# Patient Record
Sex: MALE | Race: WHITE | NOT HISPANIC OR LATINO | URBAN - METROPOLITAN AREA
[De-identification: names, ages, dates, MRNs, and addresses within clinical notes are randomized per-mention and may not be internally consistent; named-entity substitution may affect disease eponyms.]

---

## 2018-04-13 ENCOUNTER — OUTPATIENT (OUTPATIENT)
Dept: OUTPATIENT SERVICES | Facility: HOSPITAL | Age: 68
LOS: 1 days | End: 2018-04-13
Payer: COMMERCIAL

## 2018-04-13 VITALS
TEMPERATURE: 98 F | SYSTOLIC BLOOD PRESSURE: 142 MMHG | DIASTOLIC BLOOD PRESSURE: 76 MMHG | HEIGHT: 68.5 IN | HEART RATE: 66 BPM | WEIGHT: 192.02 LBS | RESPIRATION RATE: 18 BRPM

## 2018-04-13 DIAGNOSIS — E11.8 TYPE 2 DIABETES MELLITUS WITH UNSPECIFIED COMPLICATIONS: ICD-10-CM

## 2018-04-13 DIAGNOSIS — Z01.818 ENCOUNTER FOR OTHER PREPROCEDURAL EXAMINATION: ICD-10-CM

## 2018-04-13 DIAGNOSIS — H69.83 OTHER SPECIFIED DISORDERS OF EUSTACHIAN TUBE, BILATERAL: ICD-10-CM

## 2018-04-13 DIAGNOSIS — H69.90 UNSPECIFIED EUSTACHIAN TUBE DISORDER, UNSPECIFIED EAR: ICD-10-CM

## 2018-04-13 DIAGNOSIS — Z95.9 PRESENCE OF CARDIAC AND VASCULAR IMPLANT AND GRAFT, UNSPECIFIED: ICD-10-CM

## 2018-04-13 DIAGNOSIS — Z95.9 PRESENCE OF CARDIAC AND VASCULAR IMPLANT AND GRAFT, UNSPECIFIED: Chronic | ICD-10-CM

## 2018-04-13 DIAGNOSIS — Z98.49 CATARACT EXTRACTION STATUS, UNSPECIFIED EYE: Chronic | ICD-10-CM

## 2018-04-13 LAB
ALBUMIN SERPL ELPH-MCNC: 3.8 G/DL — SIGNIFICANT CHANGE UP (ref 3.3–5)
ALP SERPL-CCNC: 118 U/L — SIGNIFICANT CHANGE UP (ref 40–120)
ALT FLD-CCNC: 31 U/L — SIGNIFICANT CHANGE UP (ref 12–78)
ANION GAP SERPL CALC-SCNC: 7 MMOL/L — SIGNIFICANT CHANGE UP (ref 5–17)
APTT BLD: 33.4 SEC — SIGNIFICANT CHANGE UP (ref 27.5–37.4)
AST SERPL-CCNC: 15 U/L — SIGNIFICANT CHANGE UP (ref 15–37)
BILIRUB SERPL-MCNC: 0.4 MG/DL — SIGNIFICANT CHANGE UP (ref 0.2–1.2)
BUN SERPL-MCNC: 15 MG/DL — SIGNIFICANT CHANGE UP (ref 7–23)
CALCIUM SERPL-MCNC: 9.2 MG/DL — SIGNIFICANT CHANGE UP (ref 8.5–10.1)
CHLORIDE SERPL-SCNC: 107 MMOL/L — SIGNIFICANT CHANGE UP (ref 96–108)
CO2 SERPL-SCNC: 28 MMOL/L — SIGNIFICANT CHANGE UP (ref 22–31)
CREAT SERPL-MCNC: 1.1 MG/DL — SIGNIFICANT CHANGE UP (ref 0.5–1.3)
GLUCOSE SERPL-MCNC: 142 MG/DL — HIGH (ref 70–99)
HBA1C BLD-MCNC: 7.1 % — HIGH (ref 4–5.6)
HCT VFR BLD CALC: 45.8 % — SIGNIFICANT CHANGE UP (ref 39–50)
HGB BLD-MCNC: 15.7 G/DL — SIGNIFICANT CHANGE UP (ref 13–17)
INR BLD: 0.96 RATIO — SIGNIFICANT CHANGE UP (ref 0.88–1.16)
MCHC RBC-ENTMCNC: 31.4 PG — SIGNIFICANT CHANGE UP (ref 27–34)
MCHC RBC-ENTMCNC: 34.4 GM/DL — SIGNIFICANT CHANGE UP (ref 32–36)
MCV RBC AUTO: 91.4 FL — SIGNIFICANT CHANGE UP (ref 80–100)
PLATELET # BLD AUTO: 231 K/UL — SIGNIFICANT CHANGE UP (ref 150–400)
POTASSIUM SERPL-MCNC: 4.1 MMOL/L — SIGNIFICANT CHANGE UP (ref 3.5–5.3)
POTASSIUM SERPL-SCNC: 4.1 MMOL/L — SIGNIFICANT CHANGE UP (ref 3.5–5.3)
PROT SERPL-MCNC: 7.8 G/DL — SIGNIFICANT CHANGE UP (ref 6–8.3)
PROTHROM AB SERPL-ACNC: 10.5 SEC — SIGNIFICANT CHANGE UP (ref 9.8–12.7)
RBC # BLD: 5.01 M/UL — SIGNIFICANT CHANGE UP (ref 4.2–5.8)
RBC # FLD: 12.2 % — SIGNIFICANT CHANGE UP (ref 10.3–14.5)
SODIUM SERPL-SCNC: 142 MMOL/L — SIGNIFICANT CHANGE UP (ref 135–145)
WBC # BLD: 8.5 K/UL — SIGNIFICANT CHANGE UP (ref 3.8–10.5)
WBC # FLD AUTO: 8.5 K/UL — SIGNIFICANT CHANGE UP (ref 3.8–10.5)

## 2018-04-13 PROCEDURE — 71046 X-RAY EXAM CHEST 2 VIEWS: CPT | Mod: 26

## 2018-04-13 PROCEDURE — 85610 PROTHROMBIN TIME: CPT

## 2018-04-13 PROCEDURE — G0463: CPT

## 2018-04-13 PROCEDURE — 71046 X-RAY EXAM CHEST 2 VIEWS: CPT

## 2018-04-13 PROCEDURE — 93010 ELECTROCARDIOGRAM REPORT: CPT | Mod: NC

## 2018-04-13 PROCEDURE — 83036 HEMOGLOBIN GLYCOSYLATED A1C: CPT

## 2018-04-13 PROCEDURE — 93005 ELECTROCARDIOGRAM TRACING: CPT

## 2018-04-13 PROCEDURE — 85027 COMPLETE CBC AUTOMATED: CPT

## 2018-04-13 PROCEDURE — 80053 COMPREHEN METABOLIC PANEL: CPT

## 2018-04-13 PROCEDURE — 85730 THROMBOPLASTIN TIME PARTIAL: CPT

## 2018-04-13 NOTE — H&P PST ADULT - NEGATIVE NEUROLOGICAL SYMPTOMS
no tremors/no syncope/no headache/no loss of consciousness/no hemiparesis/no facial palsy/no focal seizures/no weakness/no paresthesias/no vertigo/no loss of sensation/no difficulty walking/no confusion/no transient paralysis/no generalized seizures

## 2018-04-13 NOTE — H&P PST ADULT - NSANTHOSAYNRD_GEN_A_CORE
No. FIDELIA screening performed.  STOP BANG Legend: 0-2 = LOW Risk; 3-4 = INTERMEDIATE Risk; 5-8 = HIGH Risk

## 2018-04-13 NOTE — H&P PST ADULT - PSH
S/P angioplasty with stent S/P angioplasty with stent    Status post cataract extraction, unspecified laterality

## 2018-04-13 NOTE — H&P PST ADULT - NEGATIVE SKIN SYMPTOMS
no rash/no tumor/no dryness/no itching/no change in size/color of mole/no pitted nails/no brittle nails/no hair loss

## 2018-04-13 NOTE — H&P PST ADULT - NEGATIVE GENERAL SYMPTOMS
no fatigue/no anorexia/no weight loss/no polyphagia/no polyuria/no polydipsia/no chills/no malaise/no fever/no sweating/no weight gain

## 2018-04-13 NOTE — H&P PST ADULT - NEGATIVE CARDIOVASCULAR SYMPTOMS
no claudication/no dyspnea on exertion/no orthopnea/no paroxysmal nocturnal dyspnea/no peripheral edema/no chest pain/no palpitations

## 2018-04-13 NOTE — H&P PST ADULT - PMH
Acute MI    Diabetes mellitus    S/P angioplasty with stent Acute MI    Diabetes mellitus    Disorder of Eustachian tube, unspecified laterality    S/P angioplasty with stent

## 2018-04-13 NOTE — H&P PST ADULT - ASSESSMENT
This is a 68 year old male with PMH of high cholesterol, MI, DM and clogged bilateral ears  who presents today for pre-surgical testing for bilateral tympanostomy with tiny t-tubes bilateral eustachan tube balloon dilation by Dr Lopes on 04/23/18.

## 2018-04-13 NOTE — H&P PST ADULT - NEGATIVE GENERAL GENITOURINARY SYMPTOMS
no renal colic/normal urinary frequency/no dysuria/no nocturia/no hematuria/no flank pain L/no bladder infections/no flank pain R/no urine discoloration/no incontinence/normal libido/no gas in urine/no urinary hesitancy

## 2018-04-13 NOTE — H&P PST ADULT - NEGATIVE PSYCHIATRIC SYMPTOMS
no memory loss/no mood swings/no agitation/no auditory hallucinations/no hyperactivity/no depression/no insomnia/no paranoia/no suicidal ideation/no anxiety/no visual hallucinations

## 2018-04-13 NOTE — H&P PST ADULT - PROBLEM SELECTOR PLAN 1
bilateral tympanostomy with tiny t-tubes bilateral eustachan tube balloon dilation by Dr Lopes on 04/23/18 planned

## 2018-04-13 NOTE — H&P PST ADULT - NEGATIVE GASTROINTESTINAL SYMPTOMS
no diarrhea/no flatulence/no constipation/no change in bowel habits/no hematochezia/no hiccoughs/no nausea/no vomiting/no melena/no jaundice/no steatorrhea/no abdominal pain

## 2018-04-13 NOTE — H&P PST ADULT - NEGATIVE MUSCULOSKELETAL SYMPTOMS
no arthralgia/no stiffness/no arm pain L/no arm pain R/no myalgia/no joint swelling/no muscle weakness/no leg pain R/no muscle cramps/no neck pain/no back pain/no leg pain L

## 2018-04-13 NOTE — H&P PST ADULT - NEGATIVE ENMT SYMPTOMS
no recurrent cold sores/no post-nasal discharge/no abnormal taste sensation/no nasal discharge/no ear pain/no hearing difficulty/no nasal obstruction/no nose bleeds/no vertigo/no gum bleeding/no dry mouth/no throat pain/no dysphagia

## 2018-04-13 NOTE — H&P PST ADULT - FAMILY HISTORY
Father  Still living? No  Diabetes mellitus, Age at diagnosis: Age Unknown  CAD (coronary artery disease), Age at diagnosis: Age Unknown  Cancer, Age at diagnosis: Age Unknown     Mother  Still living? No  Family history of rheumatoid arthritis, Age at diagnosis: Age Unknown

## 2018-04-13 NOTE — H&P PST ADULT - NEGATIVE OPHTHALMOLOGIC SYMPTOMS
no pain L/no irritation L/no loss of vision R/no scleral injection R/no scleral injection L/no lacrimation L/no discharge L/no photophobia/no pain R/no loss of vision L/no diplopia/no lacrimation R/no blurred vision L/no blurred vision R/no discharge R/no irritation R

## 2018-04-13 NOTE — H&P PST ADULT - RS GEN PE MLT RESP DETAILS PC
clear to auscultation bilaterally/no rhonchi/breath sounds equal/no wheezes/airway patent/no rales/normal/respirations non-labored/good air movement

## 2018-04-13 NOTE — H&P PST ADULT - PROBLEM SELECTOR PLAN 2
Cardiac Clearance with Dr Russo advised. Receiving clearance on 16th  Effient as per cardiology . Usually stopped 2-3 days prior and started on ASA as per patient. Instructed to verify with Dr Russo.  Cardiac meds am of surgery with sip of water. Verbalized understanding.

## 2018-04-13 NOTE — H&P PST ADULT - NEGATIVE MALE-SPECIFIC SYMPTOMS
no impotence/no erectile dysfunction/no undescended testicle L/no ejaculatory dysfunction/no scrotal mass L/no urethral discharge/no genital sores/no scrotal mass R/no undescended testicle R

## 2018-04-13 NOTE — H&P PST ADULT - PROBLEM SELECTOR PLAN 4
Labs- CBC, CMP EKG, CXR ordered.   Medical Clearance with Dr. Russo advised.  Pre op and Hibiclens instructions reviewed and given.    Avoid NSAIDs and OTC supplements. Verbalized understanding

## 2018-04-13 NOTE — H&P PST ADULT - NEGATIVE ALLERGY TYPES
no reactions to insect bites/no outdoor environmental allergies/no indoor environmental allergies/no reactions to medicines/no reactions to food/no reactions to animals

## 2018-04-13 NOTE — H&P PST ADULT - HISTORY OF PRESENT ILLNESS
This is a 68 year old male with PMH of high cholesterol, MI, DM and clogged bilateral ears  who presents today for pre-surgical testing for    on   . This is a 68 year old male with PMH of high cholesterol, MI, DM and clogged bilateral ears  who presents today for pre-surgical testing for bilateral tympanostomy with tiny t-tubes bilateral eustachan tube balloon dilation by Dr Lopes on 04/23/18.  Reports frequent cold and congestion for few months and went to see ENT and was referred for tubes and dilation in the ear.

## 2018-04-13 NOTE — H&P PST ADULT - PROBLEM SELECTOR PLAN 3
A1c pending. Last A1c -7.1.   Advised that he will need Endo consult if A1c came back 9 or above.   Instructed patient to stop taking metformin the night before and day of surgery.  Fingerstick am of surgery. Verbalized understanding.

## 2018-04-20 RX ORDER — SODIUM CHLORIDE 9 MG/ML
1000 INJECTION, SOLUTION INTRAVENOUS
Qty: 0 | Refills: 0 | Status: DISCONTINUED | OUTPATIENT
Start: 2018-04-23 | End: 2018-04-23

## 2018-04-22 ENCOUNTER — TRANSCRIPTION ENCOUNTER (OUTPATIENT)
Age: 68
End: 2018-04-22

## 2018-04-23 ENCOUNTER — OUTPATIENT (OUTPATIENT)
Dept: OUTPATIENT SERVICES | Facility: HOSPITAL | Age: 68
LOS: 1 days | End: 2018-04-23
Payer: COMMERCIAL

## 2018-04-23 VITALS
OXYGEN SATURATION: 95 % | DIASTOLIC BLOOD PRESSURE: 76 MMHG | RESPIRATION RATE: 16 BRPM | HEART RATE: 69 BPM | TEMPERATURE: 98 F | SYSTOLIC BLOOD PRESSURE: 134 MMHG

## 2018-04-23 VITALS
OXYGEN SATURATION: 97 % | HEIGHT: 68.5 IN | HEART RATE: 77 BPM | RESPIRATION RATE: 14 BRPM | TEMPERATURE: 98 F | DIASTOLIC BLOOD PRESSURE: 85 MMHG | SYSTOLIC BLOOD PRESSURE: 155 MMHG | WEIGHT: 192.02 LBS

## 2018-04-23 DIAGNOSIS — H69.83 OTHER SPECIFIED DISORDERS OF EUSTACHIAN TUBE, BILATERAL: ICD-10-CM

## 2018-04-23 DIAGNOSIS — Z98.49 CATARACT EXTRACTION STATUS, UNSPECIFIED EYE: Chronic | ICD-10-CM

## 2018-04-23 DIAGNOSIS — Z95.9 PRESENCE OF CARDIAC AND VASCULAR IMPLANT AND GRAFT, UNSPECIFIED: Chronic | ICD-10-CM

## 2018-04-23 PROCEDURE — 69436 CREATE EARDRUM OPENING: CPT | Mod: 50

## 2018-04-23 PROCEDURE — C1726: CPT

## 2018-04-23 PROCEDURE — C1889: CPT

## 2018-04-23 PROCEDURE — 69799 UNLISTED PX MIDDLE EAR: CPT | Mod: 50

## 2018-04-23 PROCEDURE — 82962 GLUCOSE BLOOD TEST: CPT

## 2018-04-23 RX ORDER — ONDANSETRON 8 MG/1
4 TABLET, FILM COATED ORAL ONCE
Qty: 0 | Refills: 0 | Status: DISCONTINUED | OUTPATIENT
Start: 2018-04-23 | End: 2018-04-23

## 2018-04-23 RX ORDER — METFORMIN HYDROCHLORIDE 850 MG/1
1 TABLET ORAL
Qty: 0 | Refills: 0 | COMMUNITY

## 2018-04-23 RX ORDER — ATORVASTATIN CALCIUM 80 MG/1
1 TABLET, FILM COATED ORAL
Qty: 0 | Refills: 0 | COMMUNITY

## 2018-04-23 RX ORDER — ACETAMINOPHEN 500 MG
1000 TABLET ORAL ONCE
Qty: 0 | Refills: 0 | Status: COMPLETED | OUTPATIENT
Start: 2018-04-23 | End: 2018-04-23

## 2018-04-23 RX ORDER — SODIUM CHLORIDE 9 MG/ML
1000 INJECTION, SOLUTION INTRAVENOUS
Qty: 0 | Refills: 0 | Status: DISCONTINUED | OUTPATIENT
Start: 2018-04-23 | End: 2018-04-23

## 2018-04-23 RX ORDER — CIPROFLOXACIN AND DEXAMETHASONE 3; 1 MG/ML; MG/ML
4 SUSPENSION/ DROPS AURICULAR (OTIC) ONCE
Qty: 0 | Refills: 0 | Status: DISCONTINUED | OUTPATIENT
Start: 2018-04-23 | End: 2018-04-23

## 2018-04-23 RX ORDER — HYDROMORPHONE HYDROCHLORIDE 2 MG/ML
0.4 INJECTION INTRAMUSCULAR; INTRAVENOUS; SUBCUTANEOUS
Qty: 0 | Refills: 0 | Status: DISCONTINUED | OUTPATIENT
Start: 2018-04-23 | End: 2018-04-23

## 2018-04-23 RX ORDER — PRASUGREL 5 MG/1
1 TABLET, FILM COATED ORAL
Qty: 0 | Refills: 0 | COMMUNITY

## 2018-04-23 RX ORDER — ASPIRIN/CALCIUM CARB/MAGNESIUM 324 MG
1 TABLET ORAL
Qty: 0 | Refills: 0 | COMMUNITY

## 2018-04-23 RX ADMIN — Medication 400 MILLIGRAM(S): at 09:31

## 2018-04-23 RX ADMIN — SODIUM CHLORIDE 30 MILLILITER(S): 9 INJECTION, SOLUTION INTRAVENOUS at 06:49

## 2018-04-23 RX ADMIN — SODIUM CHLORIDE 75 MILLILITER(S): 9 INJECTION, SOLUTION INTRAVENOUS at 09:31

## 2018-04-23 NOTE — ASU PATIENT PROFILE, ADULT - PMH
Acute MI    Diabetes mellitus    Disorder of Eustachian tube, unspecified laterality    S/P angioplasty with stent

## 2018-04-23 NOTE — ASU DISCHARGE PLAN (ADULT/PEDIATRIC). - MEDICATION SUMMARY - MEDICATIONS TO TAKE
I will START or STAY ON the medications listed below when I get home from the hospital:    Low Dose ASA 81 mg oral tablet  -- 1 tab(s) by mouth once a day  -- Indication: For Home med/Restart 4/24/18    metFORMIN 500 mg oral tablet  -- 1 tab(s) by mouth 2 times a day  -- Indication: For Home med    Lipitor 80 mg oral tablet  -- 1 tab(s) by mouth once a day  -- Indication: For Home med    Effient 10 mg oral tablet  -- 1 tab(s) by mouth once a day  -- Indication: For Home med/ Restart 4/24/18    multivitamin  -- Indication: For Home supplement

## 2018-04-23 NOTE — BRIEF OPERATIVE NOTE - PROCEDURE
<<-----Click on this checkbox to enter Procedure Myringotomy including eustachian tube inflation under general anesthesia  04/23/2018  bilateral tiny t tubes, nasoendoscopy, nasopharyngoscopy, aera device  Active  JYOUNGER

## 2020-10-13 PROBLEM — Z95.9 PRESENCE OF CARDIAC AND VASCULAR IMPLANT AND GRAFT, UNSPECIFIED: Chronic | Status: ACTIVE | Noted: 2018-04-13

## 2020-10-13 PROBLEM — H69.90 UNSPECIFIED EUSTACHIAN TUBE DISORDER, UNSPECIFIED EAR: Chronic | Status: ACTIVE | Noted: 2018-04-13

## 2020-10-13 PROBLEM — E11.9 TYPE 2 DIABETES MELLITUS WITHOUT COMPLICATIONS: Chronic | Status: ACTIVE | Noted: 2018-04-13

## 2020-10-13 PROBLEM — I21.9 ACUTE MYOCARDIAL INFARCTION, UNSPECIFIED: Chronic | Status: ACTIVE | Noted: 2018-04-13

## 2020-10-15 PROBLEM — Z00.00 ENCOUNTER FOR PREVENTIVE HEALTH EXAMINATION: Status: ACTIVE | Noted: 2020-10-15

## 2020-10-21 ENCOUNTER — APPOINTMENT (OUTPATIENT)
Dept: OTOLARYNGOLOGY | Facility: CLINIC | Age: 70
End: 2020-10-21

## 2020-10-26 ENCOUNTER — TRANSCRIPTION ENCOUNTER (OUTPATIENT)
Age: 70
End: 2020-10-26

## 2020-10-26 ENCOUNTER — APPOINTMENT (OUTPATIENT)
Dept: OTOLARYNGOLOGY | Facility: CLINIC | Age: 70
End: 2020-10-26
Payer: COMMERCIAL

## 2020-10-26 VITALS
HEIGHT: 69 IN | SYSTOLIC BLOOD PRESSURE: 135 MMHG | DIASTOLIC BLOOD PRESSURE: 75 MMHG | WEIGHT: 191 LBS | TEMPERATURE: 97.4 F | BODY MASS INDEX: 28.29 KG/M2

## 2020-10-26 DIAGNOSIS — Z83.3 FAMILY HISTORY OF DIABETES MELLITUS: ICD-10-CM

## 2020-10-26 DIAGNOSIS — H92.13 OTORRHEA, BILATERAL: ICD-10-CM

## 2020-10-26 DIAGNOSIS — F17.200 NICOTINE DEPENDENCE, UNSPECIFIED, UNCOMPLICATED: ICD-10-CM

## 2020-10-26 DIAGNOSIS — E11.65 TYPE 2 DIABETES MELLITUS WITH HYPERGLYCEMIA: ICD-10-CM

## 2020-10-26 PROCEDURE — 92557 COMPREHENSIVE HEARING TEST: CPT

## 2020-10-26 PROCEDURE — 92567 TYMPANOMETRY: CPT

## 2020-10-26 PROCEDURE — 99072 ADDL SUPL MATRL&STAF TM PHE: CPT

## 2020-10-26 PROCEDURE — 99213 OFFICE O/P EST LOW 20 MIN: CPT

## 2020-10-26 RX ORDER — SILDENAFIL 100 MG/1
100 TABLET, FILM COATED ORAL
Qty: 6 | Refills: 0 | Status: ACTIVE | COMMUNITY
Start: 2020-08-13

## 2020-10-26 RX ORDER — METFORMIN HYDROCHLORIDE 500 MG/1
500 TABLET, COATED ORAL
Qty: 180 | Refills: 0 | Status: ACTIVE | COMMUNITY
Start: 2020-09-01

## 2020-10-26 RX ORDER — PRASUGREL 5 MG/1
5 TABLET, FILM COATED ORAL
Qty: 90 | Refills: 0 | Status: ACTIVE | COMMUNITY
Start: 2020-08-13

## 2020-10-26 RX ORDER — AZELASTINE HYDROCHLORIDE 205.5 UG/1
0.15 SPRAY, METERED NASAL
Qty: 30 | Refills: 0 | Status: ACTIVE | COMMUNITY
Start: 2020-09-13

## 2020-10-26 RX ORDER — OMEPRAZOLE 40 MG/1
40 CAPSULE, DELAYED RELEASE ORAL
Qty: 90 | Refills: 0 | Status: ACTIVE | COMMUNITY
Start: 2020-09-02

## 2020-10-26 RX ORDER — FLUTICASONE PROPIONATE 50 UG/1
50 SPRAY, METERED NASAL
Qty: 16 | Refills: 0 | Status: ACTIVE | COMMUNITY
Start: 2020-08-08

## 2020-10-26 RX ORDER — ATORVASTATIN CALCIUM 80 MG/1
80 TABLET, FILM COATED ORAL
Qty: 90 | Refills: 0 | Status: ACTIVE | COMMUNITY
Start: 2020-09-01

## 2020-10-26 RX ORDER — TADALAFIL 20 MG/1
20 TABLET ORAL
Qty: 6 | Refills: 0 | Status: ACTIVE | COMMUNITY
Start: 2020-09-23

## 2020-10-26 RX ORDER — CLOTRIMAZOLE AND BETAMETHASONE DIPROPIONATE 10; .5 MG/G; MG/G
1-0.05 CREAM TOPICAL
Qty: 15 | Refills: 0 | Status: ACTIVE | COMMUNITY
Start: 2020-09-28

## 2020-10-26 NOTE — ASSESSMENT
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 Lipoflavonoids - consult cardiologist before taking. No q-tips. FU 1 year.

## 2020-10-26 NOTE — PHYSICAL EXAM
[Hearing Joyce Test (Tuning Fork On Forehead)] : no lateralization of tone [Midline] : trachea located in midline position [Normal] : no rashes [Hearing Loss Right Only] : normal [Hearing Loss Left Only] : normal [FreeTextEntry8] : no drainage [FreeTextEntry9] : no drainage [FreeTextEntry1] : minimal rightward deviated septum

## 2020-10-26 NOTE — ADDENDUM
[FreeTextEntry1] : Documented by Aida Montoya acting as scribe for Dr. Lopes on 10/26/2020.\par \par All Medical record entries made by the Scribe were at my, Dr. Lopes, direction and personally dictated by me on 10/26/2020 . I have reviewed the chart and agree that the record accurately reflects my personal performance of the history, physical exam, assessment and plan. I have also personally directed, reviewed, and agreed with the discharge instructions.

## 2020-10-26 NOTE — HISTORY OF PRESENT ILLNESS
[de-identified] : The patient presents with h/o bilateral AERA and tiny t-tubes from 4/23/18, right tube removed 7/9/18, left tube out as of 9/27/19. He had a sinus infection 2 months ago and went to urgent care and was prescribed abx. He gets a PND every morning. He hasn't used the nasal sprays as prescribed. He has been getting crusting and drainage from the ears. Pt c/o intermittent tinnitus.

## 2020-10-26 NOTE — REVIEW OF SYSTEMS
[Post Nasal Drip] : post nasal drip [Ear Pain] : ear pain [Dizziness] : dizziness [Vertigo] : vertigo [Ear Drainage] : ear drainage [Ear Noises] : ear noises [Recurrent Sinus Infections] : recurrent sinus infections [Sinus Pressure] : sinus pressure [Heartburn] : heartburn [Negative] : Heme/Lymph

## 2020-10-26 NOTE — CONSULT LETTER
[Dear  ___] : Dear  [unfilled], [Courtesy Letter:] : I had the pleasure of seeing your patient, [unfilled], in my office today. [Please see my note below.] : Please see my note below. [Consult Closing:] : Thank you very much for allowing me to participate in the care of this patient.  If you have any questions, please do not hesitate to contact me. [Sincerely,] : Sincerely, [FreeTextEntry3] : Jef Lopes MD FACS

## 2021-10-25 ENCOUNTER — APPOINTMENT (OUTPATIENT)
Dept: OTOLARYNGOLOGY | Facility: CLINIC | Age: 71
End: 2021-10-25

## 2022-05-11 NOTE — ASU PATIENT PROFILE, ADULT - ABILITY TO HEAR (WITH HEARING AID OR HEARING APPLIANCE IF NORMALLY USED):
verbal cues/1 person assist
Adequate: hears normal conversation without difficulty
Pain relief, improvement with activities of daily living
Please call Dr. Joyner' s office within next few days to schedule a follow up appointment about 14 days after surgery. Dressing will be changed during office visit. Out of bed, ambulate, weight bearing as tolerated- Physical therapy to assist with exercise and help increase endurance

## 2022-10-18 ENCOUNTER — APPOINTMENT (OUTPATIENT)
Dept: OTOLARYNGOLOGY | Facility: CLINIC | Age: 72
End: 2022-10-18

## 2022-10-18 VITALS
HEIGHT: 69 IN | SYSTOLIC BLOOD PRESSURE: 132 MMHG | DIASTOLIC BLOOD PRESSURE: 76 MMHG | HEART RATE: 81 BPM | BODY MASS INDEX: 26.71 KG/M2 | WEIGHT: 180.31 LBS

## 2022-10-18 DIAGNOSIS — K13.79 OTHER LESIONS OF ORAL MUCOSA: ICD-10-CM

## 2022-10-18 DIAGNOSIS — J34.2 DEVIATED NASAL SEPTUM: ICD-10-CM

## 2022-10-18 DIAGNOSIS — H69.83 OTHER SPECIFIED DISORDERS OF EUSTACHIAN TUBE, BILATERAL: ICD-10-CM

## 2022-10-18 PROCEDURE — 99213 OFFICE O/P EST LOW 20 MIN: CPT

## 2022-10-18 NOTE — CONSULT LETTER
[Dear  ___] : Dear  [unfilled], [Consult Letter:] : I had the pleasure of evaluating your patient, [unfilled]. [Please see my note below.] : Please see my note below. [Referral Closing:] : Thank you very much for seeing this patient.  If you have any questions, please do not hesitate to contact me. [Sincerely,] : Sincerely, [FreeTextEntry3] : Dr. Jef Lopes MD FACS

## 2022-10-18 NOTE — ASSESSMENT
[FreeTextEntry1] : Reviewed and reconciled medications, allergies, PMHx, PSHx, SocHx, FMHx.\par \par h/o bilateral AERA and tiny t-tubes from 4/23/18, right tube removed 7/9/18, left tube out as of 9/27/19\par intermittent tinnitus\par \par Physical exam:\par deviated septum\par mild tmj left greater than right\par turbinates inflamed bilaterally\par \par Audio 2020: mild SNHL with acoustic notch, 96% discrimination at 50 db\par \par Plan:\par  FU 12 months.

## 2022-10-18 NOTE — HISTORY OF PRESENT ILLNESS
[de-identified] : The patient presents with h/o bilateral AERA and tiny t-tubes from 4/23/18, right tube removed 7/9/18, left tube out as of 9/27/19.

## 2022-10-18 NOTE — PHYSICAL EXAM
[] : septum deviated to the right [Midline] : trachea located in midline position [Normal] : no rashes [de-identified] : left greater right tenderness [Hearing Loss Right Only] : normal [Hearing Loss Left Only] : normal [FreeTextEntry8] : cerumen removed via curettage [FreeTextEntry9] : cerumen removed via curettage [de-identified] : inflamed [de-identified] : c [de-identified] : large swollen uvula

## 2023-01-31 ENCOUNTER — APPOINTMENT (OUTPATIENT)
Dept: OTOLARYNGOLOGY | Facility: CLINIC | Age: 73
End: 2023-01-31
Payer: COMMERCIAL

## 2023-01-31 VITALS — BODY MASS INDEX: 26.66 KG/M2 | WEIGHT: 180 LBS | HEIGHT: 69 IN

## 2023-01-31 DIAGNOSIS — H65.90 UNSPECIFIED NONSUPPURATIVE OTITIS MEDIA, UNSPECIFIED EAR: ICD-10-CM

## 2023-01-31 PROCEDURE — 31575 DIAGNOSTIC LARYNGOSCOPY: CPT

## 2023-01-31 PROCEDURE — 92567 TYMPANOMETRY: CPT

## 2023-01-31 PROCEDURE — 99214 OFFICE O/P EST MOD 30 MIN: CPT | Mod: 25

## 2023-01-31 PROCEDURE — 92557 COMPREHENSIVE HEARING TEST: CPT

## 2023-01-31 RX ORDER — FLUTICASONE PROPIONATE 50 UG/1
50 SPRAY, METERED NASAL TWICE DAILY
Qty: 1 | Refills: 2 | Status: ACTIVE | COMMUNITY
Start: 2023-01-31 | End: 1900-01-01

## 2023-01-31 RX ORDER — AMOXICILLIN AND CLAVULANATE POTASSIUM 875; 125 MG/1; MG/1
875-125 TABLET, COATED ORAL
Qty: 20 | Refills: 1 | Status: ACTIVE | COMMUNITY
Start: 2023-01-31 | End: 1900-01-01

## 2023-01-31 NOTE — HISTORY OF PRESENT ILLNESS
[de-identified] : ears feel clogged following recent uri\par medical hx reviewed\par flying to [place] in 2 days

## 2023-01-31 NOTE — ASSESSMENT
[FreeTextEntry1] :  ; ROXIE CHL TYPE B\par AUGMENTIN\par FLONASE\par VALSALVA\par EUCALYPTUS HUMIDIFIER\par F/U 2 WEEKS

## 2023-01-31 NOTE — DATA REVIEWED
[de-identified] : type B tymp AD; type C tymp AS\par AS: wnl to a mod-severe ess SNHL 250-8000 Hz\par AD: mild to mod-severe mixed -8000 Hz l thumb

## 2023-01-31 NOTE — PHYSICAL EXAM
[Normal] : mucosa is normal [Midline] : trachea located in midline position [de-identified] : retraction mary jo/ OME mary jo right more than left

## 2023-02-01 ENCOUNTER — TRANSCRIPTION ENCOUNTER (OUTPATIENT)
Age: 73
End: 2023-02-01

## 2023-02-16 ENCOUNTER — APPOINTMENT (OUTPATIENT)
Dept: OTOLARYNGOLOGY | Facility: CLINIC | Age: 73
End: 2023-02-16
Payer: COMMERCIAL

## 2023-02-16 VITALS
SYSTOLIC BLOOD PRESSURE: 137 MMHG | BODY MASS INDEX: 26.22 KG/M2 | WEIGHT: 177 LBS | HEART RATE: 94 BPM | DIASTOLIC BLOOD PRESSURE: 81 MMHG | HEIGHT: 69 IN

## 2023-02-16 DIAGNOSIS — H93.13 TINNITUS, BILATERAL: ICD-10-CM

## 2023-02-16 DIAGNOSIS — H69.80 OTHER SPECIFIED DISORDERS OF EUSTACHIAN TUBE, UNSPECIFIED EAR: ICD-10-CM

## 2023-02-16 PROCEDURE — 99214 OFFICE O/P EST MOD 30 MIN: CPT

## 2023-02-16 PROCEDURE — 92567 TYMPANOMETRY: CPT

## 2023-02-16 PROCEDURE — 92557 COMPREHENSIVE HEARING TEST: CPT

## 2023-02-16 NOTE — ASSESSMENT
[FreeTextEntry1] :  IMPROVED HEARING\par TYPE A\par CONTINUE VALSALVA\par FLONASE\par EUCALYPTUS HUMIDIFER\par SOUND MASKING\par F/U 4 TO 6 WEEKS

## 2023-02-16 NOTE — DATA REVIEWED
[de-identified] : tymp: AD= As AS= A\par audio: AD= mild / moderate conductive HL\par AS= mild to moderate snhl

## 2023-02-16 NOTE — PHYSICAL EXAM
[Normal] : mucosa is normal [Midline] : trachea located in midline position [de-identified] : TM RETRACTION IMPROVED

## 2023-04-06 ENCOUNTER — APPOINTMENT (OUTPATIENT)
Dept: OTOLARYNGOLOGY | Facility: CLINIC | Age: 73
End: 2023-04-06

## 2024-12-27 ENCOUNTER — APPOINTMENT (OUTPATIENT)
Dept: ORTHOPEDIC SURGERY | Facility: CLINIC | Age: 74
End: 2024-12-27
Payer: COMMERCIAL

## 2024-12-27 VITALS — BODY MASS INDEX: 26.22 KG/M2 | WEIGHT: 177 LBS | HEIGHT: 69 IN

## 2024-12-27 DIAGNOSIS — M43.16 SPONDYLOLISTHESIS, LUMBAR REGION: ICD-10-CM

## 2024-12-27 DIAGNOSIS — M51.16 INTERVERTEBRAL DISC DISORDERS WITH RADICULOPATHY, LUMBAR REGION: ICD-10-CM

## 2024-12-27 DIAGNOSIS — M48.061 SPINAL STENOSIS, LUMBAR REGION WITHOUT NEUROGENIC CLAUDICATION: ICD-10-CM

## 2024-12-27 DIAGNOSIS — M54.16 RADICULOPATHY, LUMBAR REGION: ICD-10-CM

## 2024-12-27 PROCEDURE — 72100 X-RAY EXAM L-S SPINE 2/3 VWS: CPT

## 2024-12-27 PROCEDURE — 72170 X-RAY EXAM OF PELVIS: CPT

## 2024-12-27 PROCEDURE — 99203 OFFICE O/P NEW LOW 30 MIN: CPT

## 2024-12-27 RX ORDER — METHOCARBAMOL 750 MG/1
750 TABLET, FILM COATED ORAL TWICE DAILY
Qty: 60 | Refills: 1 | Status: ACTIVE | COMMUNITY
Start: 2024-12-27 | End: 1900-01-01

## 2024-12-27 RX ORDER — METHYLPREDNISOLONE 4 MG/1
4 TABLET ORAL
Qty: 1 | Refills: 0 | Status: ACTIVE | COMMUNITY
Start: 2024-12-27 | End: 1900-01-01

## 2025-01-13 ENCOUNTER — APPOINTMENT (OUTPATIENT)
Dept: ORTHOPEDIC SURGERY | Facility: CLINIC | Age: 75
End: 2025-01-13

## 2025-01-13 DIAGNOSIS — M54.16 RADICULOPATHY, LUMBAR REGION: ICD-10-CM

## 2025-01-13 DIAGNOSIS — M43.16 SPONDYLOLISTHESIS, LUMBAR REGION: ICD-10-CM

## 2025-01-13 DIAGNOSIS — M48.061 SPINAL STENOSIS, LUMBAR REGION WITHOUT NEUROGENIC CLAUDICATION: ICD-10-CM

## 2025-01-13 DIAGNOSIS — M65.331 TRIGGER FINGER, RIGHT MIDDLE FINGER: ICD-10-CM

## 2025-01-13 DIAGNOSIS — M51.16 INTERVERTEBRAL DISC DISORDERS WITH RADICULOPATHY, LUMBAR REGION: ICD-10-CM

## 2025-01-13 PROCEDURE — J3490M: CUSTOM

## 2025-01-13 PROCEDURE — 20550 NJX 1 TENDON SHEATH/LIGAMENT: CPT | Mod: RT

## 2025-01-13 PROCEDURE — 99203 OFFICE O/P NEW LOW 30 MIN: CPT | Mod: 25

## 2025-01-13 PROCEDURE — 73130 X-RAY EXAM OF HAND: CPT | Mod: RT
